# Patient Record
Sex: MALE | Race: WHITE
[De-identification: names, ages, dates, MRNs, and addresses within clinical notes are randomized per-mention and may not be internally consistent; named-entity substitution may affect disease eponyms.]

---

## 2019-01-12 ENCOUNTER — HOSPITAL ENCOUNTER (EMERGENCY)
Dept: HOSPITAL 74 - FER | Age: 34
Discharge: HOME | End: 2019-01-12
Payer: COMMERCIAL

## 2019-01-12 VITALS — HEART RATE: 85 BPM | TEMPERATURE: 97.5 F | SYSTOLIC BLOOD PRESSURE: 120 MMHG | DIASTOLIC BLOOD PRESSURE: 87 MMHG

## 2019-01-12 VITALS — BODY MASS INDEX: 24.7 KG/M2

## 2019-01-12 DIAGNOSIS — R00.2: Primary | ICD-10-CM

## 2019-01-12 NOTE — PDOC
History of Present Illness





- General


Chief Complaint: Palpitations


Stated Complaint: PALPITATIONS


Time Seen by Provider: 01/12/19 21:46


History Source: Patient


Exam Limitations: No Limitations





- History of Present Illness


Initial Comments: 





01/12/19 22:45


  This is a 33-year-old male who comes in complaining of palpitations. Patient 

has history of lesions in the past. Patient attempted to take his heart rate 

and said it was so fast he was unable to count the beats. Patient said symptoms 

lasted about 5 minutes and then heart rate began to slow down and by the time 

he got here was normal patient said the symptoms were similar in the past. Last 

incident was about 2 years ago. Patient otherwise says he is healthy denies any 

medical problems. During the palpitations patient denies feeling lightheaded 

nauseous sweaty but did complain of some mild left-sided chest discomfort. 

Patient described it as a tightness. Patient has no cardiac risk factors 

including hypertension high cholesterol diabetes or family history.





Allergies: None


Past Medical History: none


Social history: Lives with family. No smoking. No alcohol. No illicit drugs.


Surgical history: None








General:  No fevers or chills, no weakness, no weight loss 


HEENT: No change in vision.  No sore throat,. No ear pain


CardioVascular: no chest discomfort. No shortness of breath


Respiratory:No cough, or wheezing. 


Gastrointestinal:  no nausea, vomiting, diarrhea or constipation,  No rectal 

bleeding


Genitourinary:  No dysuria, hematuria, or frequency


Musculoskeletal:  No joint or muscle pain or swelling


Neurologic: No headache, vertigo, dizziness or loss of consciousness


Psychiatric: nor depression 


Skin: No rashes or easy bruising


Endocrine: no increased thirst or abnormal weight change


Allergic: no skin or latex allergy


All other systems reviewed and normal





Exam:





General: Well-nourished well-developed individual, no acute distress


HEENT: Throat: Normal, tonsils normal, no erythema or exudate


               Neck: Supple, no meningeal signs, no lymphadenopathy


Eyes::Pupils equal reactive and round, extraocular motion intact


Chest: Nontender to palpation 


Cardiac: S1-S2 normal, regular rate and rhythm, no murmurs rubs or gallops


Respiratory: Lungs clear to auscultation bilateral


Abdomen: Soft, nondistended, normal bowel sounds, there is no tenderness on 

palpation diffusely


Extremities: Warm, dry, no cyanosis, clubbing, or edema


Skin: No rashes


Neuro: Alert and oriented x3, CN II - XII intact, nonfocal exam with normal 

strength, normal sensation, normal reflexes, normal gait, 


Psych: Normal mood and affect





EKG showed normal sinus rhythm no acute ST-T wave changes,  normal intervals 

normal EKG





Assessment and plan: This is a 33-year-old male with palpitations that resolved 

after a few minutes. Patient's symptoms most likely was secondary to an SVT. 

Patient has a primary care doctor he will follow-up with for referral to a 

cardiologist next week.





Past History





- Past Medical History


Allergies/Adverse Reactions: 


 Allergies











Allergy/AdvReac Type Severity Reaction Status Date / Time


 


No Known Allergies Allergy   Verified 01/12/19 21:46











Home Medications: 


Ambulatory Orders





NK [No Known Home Medication]  01/12/19 











*Physical Exam





- Vital Signs


 Last Vital Signs











Temp Pulse Resp BP Pulse Ox


 


 97.5 F L  85   17   120/87   98 


 


 01/12/19 21:43  01/12/19 21:43  01/12/19 21:43  01/12/19 21:43  01/12/19 21:43














Moderate Sedation





- Procedure Monitoring


Vital Signs: 


Procedure Monitoring Vital Signs











Temperature  97.5 F L  01/12/19 21:43


 


Pulse Rate  85   01/12/19 21:43


 


Respiratory Rate  17   01/12/19 21:43


 


Blood Pressure  120/87   01/12/19 21:43


 


O2 Sat by Pulse Oximetry (%)  98   01/12/19 21:43











*DC/Admit/Observation/Transfer


Diagnosis at time of Disposition: 


 Palpitations








- Discharge Dispostion


Disposition: HOME


Condition at time of disposition: Good


Decision to Admit order: No





- Referrals


Referrals: 


George Glez [Primary Care Provider] - 





- Patient Instructions


Additional Instructions: 


It is important to follow up with a cardiologist this next week.





Return to the emergency department immediately with ANY new, persistent or 

worsening symptoms.





Continue any medications as previously prescribed by your physician.





You should follow up with your primary doctor as soon as possible regarding 

today's emergency department visit.


.


Please make sure your doctor reviews the results of your emergency evaluation.





Thank you for coming to the   Emergency Department today for your care. It was 

a pleasure to see you today. Please note that your evaluation is INCOMPLETE 

until you  follow-up with your doctor. 





- Post Discharge Activity

## 2019-01-15 NOTE — EKG
Test Reason : 

Blood Pressure : ***/*** mmHG

Vent. Rate : 083 BPM     Atrial Rate : 083 BPM

   P-R Int : 136 ms          QRS Dur : 098 ms

    QT Int : 356 ms       P-R-T Axes : 035 046 048 degrees

   QTc Int : 418 ms

 

NORMAL SINUS RHYTHM

NORMAL ECG

NO PREVIOUS ECGS AVAILABLE

Confirmed by MD APURVA, AFSANEH ([id number]) on 1/15/2019 6:17:54 PM

 

Referred By:  JOB           Confirmed By:AFSANEH MIXON MD